# Patient Record
Sex: MALE | Race: BLACK OR AFRICAN AMERICAN | NOT HISPANIC OR LATINO | Employment: FULL TIME | URBAN - METROPOLITAN AREA
[De-identification: names, ages, dates, MRNs, and addresses within clinical notes are randomized per-mention and may not be internally consistent; named-entity substitution may affect disease eponyms.]

---

## 2024-05-02 ENCOUNTER — HOSPITAL ENCOUNTER (EMERGENCY)
Facility: HOSPITAL | Age: 34
Discharge: HOME | End: 2024-05-03
Payer: COMMERCIAL

## 2024-05-02 VITALS
DIASTOLIC BLOOD PRESSURE: 65 MMHG | BODY MASS INDEX: 23.32 KG/M2 | TEMPERATURE: 99 F | SYSTOLIC BLOOD PRESSURE: 122 MMHG | HEIGHT: 65 IN | WEIGHT: 140 LBS | RESPIRATION RATE: 16 BRPM | OXYGEN SATURATION: 98 % | HEART RATE: 73 BPM

## 2024-05-02 DIAGNOSIS — K08.89 PAIN, DENTAL: Primary | ICD-10-CM

## 2024-05-02 PROCEDURE — 99283 EMERGENCY DEPT VISIT LOW MDM: CPT | Performed by: NURSE PRACTITIONER

## 2024-05-02 PROCEDURE — 99283 EMERGENCY DEPT VISIT LOW MDM: CPT

## 2024-05-02 ASSESSMENT — COLUMBIA-SUICIDE SEVERITY RATING SCALE - C-SSRS
2. HAVE YOU ACTUALLY HAD ANY THOUGHTS OF KILLING YOURSELF?: NO
6. HAVE YOU EVER DONE ANYTHING, STARTED TO DO ANYTHING, OR PREPARED TO DO ANYTHING TO END YOUR LIFE?: NO
1. IN THE PAST MONTH, HAVE YOU WISHED YOU WERE DEAD OR WISHED YOU COULD GO TO SLEEP AND NOT WAKE UP?: NO

## 2024-05-03 PROCEDURE — 2500000001 HC RX 250 WO HCPCS SELF ADMINISTERED DRUGS (ALT 637 FOR MEDICARE OP): Performed by: NURSE PRACTITIONER

## 2024-05-03 RX ORDER — AMOXICILLIN AND CLAVULANATE POTASSIUM 875; 125 MG/1; MG/1
1 TABLET, FILM COATED ORAL EVERY 12 HOURS
Qty: 14 TABLET | Refills: 0 | Status: SHIPPED | OUTPATIENT
Start: 2024-05-03 | End: 2024-05-10

## 2024-05-03 RX ORDER — AMOXICILLIN AND CLAVULANATE POTASSIUM 875; 125 MG/1; MG/1
1 TABLET, FILM COATED ORAL ONCE
Status: COMPLETED | OUTPATIENT
Start: 2024-05-03 | End: 2024-05-03

## 2024-05-03 RX ADMIN — AMOXICILLIN AND CLAVULANATE POTASSIUM 1 TABLET: 875; 125 TABLET, FILM COATED ORAL at 01:30

## 2024-05-03 NOTE — ED PROVIDER NOTES
HPI   Chief Complaint   Patient presents with    Dental Pain       Patient is a healthy nontoxic-appearing 34-year-old male with no past medical history, presents the emergency room today for complaint dental pain.  Patient states of the past 2 days he has had gradually worsening pain to the right lower jaw.  Patient states he knows he has a cracked tooth there was concerned may be becoming infected.  Patient denies injuries trauma or falls, hot or cold sensitivity bleeding, shortness of breath difficulty breathing, sensation of airway closing.  Patient denies any ear pain, sore throat, chest pain, abdominal pain, nausea vomit, fever, shaking, or chills.                          No data recorded                   Patient History   History reviewed. No pertinent past medical history.  History reviewed. No pertinent surgical history.  No family history on file.  Social History     Tobacco Use    Smoking status: Not on file    Smokeless tobacco: Not on file   Substance Use Topics    Alcohol use: Not on file    Drug use: Not on file       Physical Exam   ED Triage Vitals [05/02/24 2328]   Temperature Heart Rate Respirations BP   37.2 °C (99 °F) 73 16 122/65      Pulse Ox Temp Source Heart Rate Source Patient Position   98 % Temporal -- --      BP Location FiO2 (%)     -- --       Physical Exam  Vitals and nursing note reviewed.   Constitutional:       General: He is not in acute distress.     Appearance: Normal appearance. He is not ill-appearing, toxic-appearing or diaphoretic.   HENT:      Head: Normocephalic.      Nose: Nose normal.      Mouth/Throat:      Mouth: Mucous membranes are moist.      Pharynx: No oropharyngeal exudate or posterior oropharyngeal erythema.      Comments: Cracked molar to the right lower jaw with no gumline fluctuance, erosion or bleeding present.  Eyes:      Extraocular Movements: Extraocular movements intact.      Pupils: Pupils are equal, round, and reactive to light.   Cardiovascular:       Rate and Rhythm: Normal rate and regular rhythm.      Pulses: Normal pulses.      Heart sounds: Normal heart sounds. No murmur heard.     No friction rub. No gallop.   Pulmonary:      Effort: Pulmonary effort is normal. No respiratory distress.      Breath sounds: Normal breath sounds. No stridor. No wheezing, rhonchi or rales.   Chest:      Chest wall: No tenderness.   Musculoskeletal:         General: No swelling, tenderness, deformity or signs of injury. Normal range of motion.      Cervical back: Normal range of motion and neck supple.      Right lower leg: No edema.      Left lower leg: No edema.   Skin:     General: Skin is warm.      Capillary Refill: Capillary refill takes less than 2 seconds.      Coloration: Skin is not jaundiced or pale.      Findings: No bruising, erythema, lesion or rash.   Neurological:      General: No focal deficit present.      Mental Status: He is alert and oriented to person, place, and time.         ED Course & MDM   Diagnoses as of 05/03/24 0153   Pain, dental       Medical Decision Making  Given patient's complaint presentation a thorough exam was performed.  On exam patient has Cracked molar to the right lower jaw with no gumline fluctuance, erosion or bleeding present.  Patient remains hemodynamically stable during emergency evaluation, is afebrile, no trismus present, no stridor or wheezing auscultated over the neck, no adventitious lung sounds auscultated, speaking pleat sentences no respiratory distress, I do low suspicion for airway compromise, Max's angina.  Given findings I suspect a cracked tooth is the cause of patient's symptoms.  Patient received prescription for Augmentin.  Patient received Augmentin in the emergency room.  Patient also received prescription for naproxen.  I encourage patient monitor symptoms, if they become worse return to emergency room for further evaluation, otherwise follow-up with dentistry within the next several days.  Patient was  agreeable with this plan and discharged home in stable condition.    HIMANSHU Lutz     Portions of this note were generated using digital voice recognition software, and may contain grammatical errors      Procedure  Procedures     HIMANSHU Lutz  05/03/24 0153

## 2024-05-03 NOTE — DISCHARGE INSTRUCTIONS
Here is a list of free dental clinics in [place], [place]  1. [facility]  [facility]  [address]  [place], [place] - [address]  [phone]    See Clinic Full Details    2. [facility]  [facility]  [address]  [place], [place] - [address]  [phone]    See Clinic Full Details  3. [facility]  [address] [place], [place] - [address]  [phone]    See Clinic Full Details  4. [facility]  [address], [place] - [address]  [phone]    See Clinic Full Details    5. [facility]  [address] [facility] [address], [place] - [address]  [phone]    See Clinic Full Details  6. [facility]  [address] [facility] [place] - [address]  [phone]    See Clinic Full Details  7. [facility]  [address] [facility], [place] - [address]  [phone]    See Clinic Full Details  8. [facility]  [facility]  [address] [facility] [place], [place] - [address]  [phone]    See Clinic Full Details    9. [facility]  [facility]  [address]  [place], [place] - [address]  [phone]    See Clinic Full Details  10. [facility]  [address] [facility] [place] [facility] [address]  [phone]    See Clinic Full Details  11. [facility] [place]  [facility]  [address]   [place], [place] - [address]  [phone]    See Clinic Full Details  12. [facility]  [address]  [facility]  [place] - [address]  [phone]    See Clinic Full Details